# Patient Record
Sex: MALE | Race: WHITE | NOT HISPANIC OR LATINO | Employment: OTHER | URBAN - METROPOLITAN AREA
[De-identification: names, ages, dates, MRNs, and addresses within clinical notes are randomized per-mention and may not be internally consistent; named-entity substitution may affect disease eponyms.]

---

## 2023-05-24 ENCOUNTER — HOSPITAL ENCOUNTER (EMERGENCY)
Facility: HOSPITAL | Age: 38
Discharge: HOME | End: 2023-05-24
Attending: EMERGENCY MEDICINE | Admitting: EMERGENCY MEDICINE
Payer: OTHER GOVERNMENT

## 2023-05-24 VITALS
BODY MASS INDEX: 25.59 KG/M2 | SYSTOLIC BLOOD PRESSURE: 119 MMHG | TEMPERATURE: 97.8 F | RESPIRATION RATE: 18 BRPM | OXYGEN SATURATION: 96 % | HEART RATE: 75 BPM | WEIGHT: 168.87 LBS | HEIGHT: 68 IN | DIASTOLIC BLOOD PRESSURE: 80 MMHG

## 2023-05-24 DIAGNOSIS — M54.12 CERVICAL RADICULOPATHY: Primary | ICD-10-CM

## 2023-05-24 PROCEDURE — 97140 MANUAL THERAPY 1/> REGIONS: CPT | Performed by: PHYSICAL THERAPIST

## 2023-05-24 PROCEDURE — 97161 PT EVAL LOW COMPLEX 20 MIN: CPT | Performed by: PHYSICAL THERAPIST

## 2023-05-24 PROCEDURE — 99283 EMERGENCY DEPT VISIT LOW MDM: CPT

## 2023-05-24 RX ORDER — CYCLOBENZAPRINE HCL 10 MG
10 TABLET ORAL 3 TIMES DAILY PRN
Qty: 20 TABLET | Refills: 0 | Status: SHIPPED | OUTPATIENT
Start: 2023-05-24

## 2023-05-24 RX ORDER — NAPROXEN 500 MG/1
500 TABLET ORAL 2 TIMES DAILY PRN
Qty: 20 TABLET | Refills: 0 | Status: SHIPPED | OUTPATIENT
Start: 2023-05-24

## 2023-05-24 NOTE — THERAPY EVALUATION
Patient Name: Dioni Anderson  : 1985    MRN: [id number]                              Today's Date: 2023       Admit Date: 2023    Visit Dx:     ICD-10-CM ICD-9-CM   1. Cervical radiculopathy  M54.12 723.4     There is no problem list on file for this patient.    History reviewed. No pertinent past medical history.  Past Surgical History:   Procedure Laterality Date   • DENTAL PROCEDURE     • TONSILLECTOMY        General Information     Row Name 23 1059          Physical Therapy Time and Intention    Document Type evaluation  -LR     Mode of Treatment individual therapy  -LR     Row Name 23 1059          General Information    Patient Profile Reviewed yes  -LR     Prior Level of Function independent:  -LR           User Key  (r) = Recorded By, (t) = Taken By, (c) = Cosigned By    Initials Name Provider Type    LR Lauren Rodriguez PT Physical Therapist              History: Pt reports he has had pain on the left side of his neck and into his arm for about one week now.  He states his left arm is having intermittent numbness and tingling.  Pain is currently a 4/10.  Pain is increased with shoulder movement.  Pt denies chest pain.  Pt is RHD.  He states the pain has disturbed his sleep.  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    Objective:  Posture: forward head, increased thoracic kyphosis    Palpation: Tender to palpation at L upper trap and levator, L cervical paraspinals, cervical spinous processes    ROM:  Active Cervical ROM:  Flexion: 21°  Extension: 25°  L Side bending: 15°  R Side bendin°  L Rotation: 70°  R Rotation: 70°    Tightness in L upper trap    Active Shoulder ROM:  L Shoulder ROM:  R Shoulder ROM:  Flexion: WNL   Flexion: WNL  Abduction: WNL  Abduction: WNL  External Rotation: WNL External Rotation: WNL  Internal Rotation: WNL Internal Rotation: WNL     Strength:  L Shoulder MMT:   R Shoulder MMT:  Flexion: 5   Flexion: 5  Abduction: 5   Abduction:  5  External Rotation: 5  External Rotation: 5  Internal Rotation: 5  Internal Rotation: 5    L Elbow MMT:   R Elbow MMT:  Flexion: 5   Flexion: 5  Extension: 4+   Extension: 5    L Wrist MMT:   R Wrist MMT:  Flexion: 4+   Flexion: 4+  Extension: 4+   Extension: 4+    Decreased  strength on L    Special Tests:  Cervical Compression Test: positive on the left  Cervical Distraction Test: positive     Sensation: L UE sensation intact; numbness/tingling into L arm    Assessment/Plan:   Pt presents with a diagnosis of neck and left arm pain and has signs/symptoms consistent with cervical radiculopathy to include numbness/tingling in L arm, L UE weakness, neck pain, and positive spurlings test that are limiting his ability to reach and sleep.  Manual therapy was performed to cervical spine to help reduce radicular symptoms.  Pt was also educated on cervical and thoracic mobility exercises and scapular strengthening exercises.  He was educated on use of a supportive pillow for sleeping.       Goals:   LTG 1: The patient will be independent in HEP in order to decrease pain and improve tolerance to functional activities.  STATUS: Met    Interventions:   Manual Therapy: manual cervical distraction in supine with neck in slight flexion, supine cervical sideglides (C3-6), manual stretching of L upper trap and levator    Therapeutic Exercises: upper trap stretch (2x20 seconds), levator stretch (2x20 seconds), seated thoracic extension, rows with red band, B shoulder extension with band    Modalities: not performed   Outcome Measures     Row Name 05/24/23 2234          Optimal Instrument    Optimal Instrument Optimal - 3  -LR     Reaching 2  -LR     Grasping 2  -LR     Lifting 2  -LR     From the list, choose the 3 activities you would most like to be able to do without any difficulty Reaching;Lifting;Grasping  -LR     Total Score Optimal - 3 6  -LR     Row Name 05/24/23 1054          Functional Assessment    Outcome Measure  Options Optimal Instrument  -LR           User Key  (r) = Recorded By, (t) = Taken By, (c) = Cosigned By    Initials Name Provider Type    LR Lauren Rodriguez, PT Physical Therapist                 05/24/23 1100   PT Evaluation Complexity   History, PT Evaluation Complexity no personal factors and/or comorbidities   Examination of Body Systems (PT Eval Complexity) 1-2 elements   Clinical Presentation (PT Evaluation Complexity) stable   Clinical Decision Making (PT Evaluation Complexity) low complexity   Overall Complexity (PT Evaluation Complexity) low complexity      Time Calculation:    PT Charges     Row Name 05/24/23 1100             Time Calculation    PT Received On 05/24/23  -LR         Time Calculation- PT    Total Timed Code Minutes- PT 30 minute(s)  -LR         Timed Charges    17448 - PT Therapeutic Exercise Minutes 4  -LR      19152 - PT Manual Therapy Minutes 10  -LR         Untimed Charges    PT Eval/Re-eval Minutes 16  -LR         Total Minutes    Timed Charges Total Minutes 14  -LR      Untimed Charges Total Minutes 16  -LR       Total Minutes 30  -LR            User Key  (r) = Recorded By, (t) = Taken By, (c) = Cosigned By    Initials Name Provider Type    Lauren Baldwin, PT Physical Therapist              Therapy Charges for Today     Code Description Service Date Service Provider Modifiers Qty    62289130962 HC PT MANUAL THERAPY EA 15 MIN 5/24/2023 Lauren Rodriguez, PT GP 1    07808758845 HC PT EVAL LOW COMPLEXITY 2 5/24/2023 Lauren Rodriguez, PT GP 1          PT G-Codes  Outcome Measure Options: Optimal Instrument       Lauren Rodriguez, PT  5/24/2023

## 2023-05-24 NOTE — ED PROVIDER NOTES
Time: 12:04 PM EDT  Date of encounter:  5/24/2023  Independent Historian/Clinical History and Information was obtained by:   Patient  Chief Complaint:.  Left shoulder pain with numbness and tingling of the hand    History is limited by: N/A    History of Present Illness:  Patient is a 37 y.o. year old male who presents to the emergency department for evaluation of    Left posterior shoulder and neck pain.  Patient reports symptoms began last Thursday/Friday.  Patient complains of pain mostly in the posterior left shoulder region.  Patient reports the pain rates into the left side of his neck and into his left upper arm.  Patient also states he gets the numbness and tingling sensation in his left hand.  Patient states it makes it hard for him to sleep.  Patient reports that sleeping on his left side exacerbates the pain.  Patient is unaware of any direct trauma or injury.  Patient is right-hand dominant.  Patient denies any chest pain.  Patient does state however he was sent to the ER from his PCM to rule out cardiac cause of this pain.  HPI    Patient Care Team  Primary Care Provider: Provider, No Known    Past Medical History:     Allergies   Allergen Reactions   • Penicillins Unknown - Low Severity     History reviewed. No pertinent past medical history.  Past Surgical History:   Procedure Laterality Date   • DENTAL PROCEDURE     • TONSILLECTOMY       History reviewed. No pertinent family history.    Home Medications:  Prior to Admission medications    Not on File        Social History:   Social History     Tobacco Use   • Smoking status: Never   Substance Use Topics   • Alcohol use: Yes     Comment: Occasional   • Drug use: Never         Review of Systems:  Review of Systems   Constitutional: Negative for chills and fever.   HENT: Negative for congestion, ear pain and sore throat.    Eyes: Negative for pain.   Respiratory: Negative for cough, chest tightness and shortness of breath.    Cardiovascular: Negative  "for chest pain.   Gastrointestinal: Negative for abdominal pain, diarrhea, nausea and vomiting.   Genitourinary: Negative for flank pain and hematuria.   Musculoskeletal: Positive for arthralgias and neck pain. Negative for joint swelling.   Skin: Negative for pallor.   Neurological: Positive for numbness. Negative for seizures and headaches.   All other systems reviewed and are negative.       Physical Exam:  /80   Pulse 75   Temp 97.8 °F (36.6 °C) (Oral)   Resp 18   Ht 172.7 cm (68\")   Wt 76.6 kg (168 lb 14 oz)   SpO2 96%   BMI 25.68 kg/m²     Physical Exam       Vital signs were reviewed under triage note.  General appearance - Patient appears well-developed and well-nourished.  Patient is in no acute distress.  Head - Normocephalic, atraumatic.  Pupils - Equal, round, reactive to light.  Extraocular muscles are intact.  Conjunctiva is clear.  Nasal - Normal inspection.  No evidence of trauma or epistaxis.  Tympanic membranes - Gray, intact without erythema or retractions.  Oral mucosa - Pink and moist without lesions or erythema.  Uvula is midline.  Chest wall - Atraumatic.  Chest wall is nontender.  There are no vesicular rashes noted.  Neck - Supple.  Trachea was midline.  There is no palpable lymphadenopathy or thyromegaly.  There are no meningeal signs  Lungs - Clear to auscultation and percussion bilaterally.  Heart - Regular rate and rhythm without any murmurs, clicks, or gallops.  Abdomen - Soft.  Bowel sounds are present.  There is no palpable tenderness.  There is no rebound, guarding, or rigidity.  There are no palpable masses.  There are no pulsatile masses.  Back - Spine is straight and midline.  There is no CVA tenderness.  Extremities - Intact x4 with full range of motion.  Patient has palpable tenderness with palpation over the upper posterior portion of the left shoulder in the trapezius muscle area.  Symptoms can be reproduced with shoulder or neck movement.  There is no palpable " edema.  Pulses are intact x4 and equal.  Neurologic - Patient is awake, alert, and oriented x3.  Cranial nerves II through XII are grossly intact.  Motor and sensory functions grossly intact.  Cerebellar function was normal.  Integument - There are no rashes.  There are no petechia or purpura lesions noted.  There are no vesicular lesions noted.      Procedures:  Procedures      Medical Decision Making:      Comorbidities that affect care:    Depression/anxiety    External Notes reviewed:    None      The following orders were placed and all results were independently analyzed by me:  Orders Placed This Encounter   Procedures   • PT Plan of Care Cert / Re-Cert       Medications Given in the Emergency Department:  Medications - No data to display     ED Course:     The patient was seen and evaluated in the ED by me.  The above history and physical examination was performed as document.  The patient's symptoms are clearly not cardiac in nature.  Patient's symptoms can range anywhere from muscle skeletal strain to possible true cervical radiculopathy.  Patient was seen and evaluated by our physical therapist.  Patient was provided treatment and exercises for home.  Advised the patient to follow-up with his PCM and if the symptoms persist he may need an MRI to rule out herniated disc with cervical radiculopathy.  Patient may also benefit from outpatient physical therapy.    Labs:    Lab Results (last 24 hours)     ** No results found for the last 24 hours. **           Imaging:    No Radiology Exams Resulted Within Past 24 Hours      Differential Diagnosis and Discussion:    Extremity Pain: Differential diagnosis includes but is not limited to soft tissue sprain, tendonitis, tendon injury, dislocation, fracture, deep vein thrombosis, arterial insufficiency, osteoarthritis, bursitis, and ligamentous damage.        [facility]         Patient Care Considerations:    LABS: I considered ordering labs, however Laboratory analysis  would not alter the ED treatment course and plan. MRI: I considered ordering an MRI however MRI is not at an acute ED test but can be done as an outpatient.      Consultants/Shared Management Plan:    Consultant: I have discussed the case with ED physical therapist who states Agreed this is a muscle skeletal in nature.  Patient was evaluated, treated, and home exercises were provided to the patient.    Social Determinants of Health:    Patient is independent, reliable, and has access to care.       Disposition and Care Coordination:    Discharged: The patient is suitable and stable for discharge with no need for consideration of observation or admission.        Final diagnoses:   Cervical radiculopathy        ED Disposition     ED Disposition   Discharge    Condition   Stable    Comment   --             This medical record created using voice recognition software.           Douglas Almeida DO  05/25/23 105

## 2023-05-24 NOTE — DISCHARGE INSTRUCTIONS
Activities as tolerated.  Take medication as prescribed.  Perform exercises as instructed by the physical therapist.  Follow-up your primary care provider for further evaluation work-up as an outpatient.  Return to the ER for any new or worsening symptoms.